# Patient Record
Sex: FEMALE | Race: WHITE | NOT HISPANIC OR LATINO | Employment: FULL TIME | URBAN - METROPOLITAN AREA
[De-identification: names, ages, dates, MRNs, and addresses within clinical notes are randomized per-mention and may not be internally consistent; named-entity substitution may affect disease eponyms.]

---

## 2023-12-28 ENCOUNTER — OFFICE VISIT (OUTPATIENT)
Dept: URGENT CARE | Facility: CLINIC | Age: 58
End: 2023-12-28
Payer: COMMERCIAL

## 2023-12-28 VITALS
RESPIRATION RATE: 14 BRPM | DIASTOLIC BLOOD PRESSURE: 74 MMHG | TEMPERATURE: 98.3 F | SYSTOLIC BLOOD PRESSURE: 110 MMHG | HEART RATE: 79 BPM | OXYGEN SATURATION: 98 %

## 2023-12-28 DIAGNOSIS — G93.31 POST VIRAL SYNDROME: Primary | ICD-10-CM

## 2023-12-28 PROCEDURE — 99212 OFFICE O/P EST SF 10 MIN: CPT | Mod: 25

## 2023-12-28 RX ORDER — OMEPRAZOLE 40 MG/1
40 CAPSULE, DELAYED RELEASE ORAL
COMMUNITY
Start: 2023-05-03

## 2023-12-28 RX ORDER — MELOXICAM 15 MG/1
1 TABLET ORAL DAILY
COMMUNITY
Start: 2019-06-21

## 2023-12-28 RX ORDER — DICYCLOMINE HYDROCHLORIDE 20 MG/1
1 TABLET ORAL EVERY 6 HOURS
COMMUNITY
Start: 2023-01-13

## 2023-12-28 RX ORDER — ZONISAMIDE 50 MG/1
50 CAPSULE ORAL
COMMUNITY
Start: 2017-10-20

## 2023-12-28 RX ORDER — SUMATRIPTAN SUCCINATE 100 MG/1
100 TABLET ORAL EVERY 2 HOUR PRN
COMMUNITY

## 2023-12-28 RX ORDER — FLUTICASONE PROPIONATE 50 MCG
SPRAY, SUSPENSION (ML) NASAL
COMMUNITY
Start: 2018-05-08

## 2023-12-28 RX ORDER — MECLIZINE HYDROCHLORIDE 25 MG/1
25 TABLET ORAL 3 TIMES DAILY
COMMUNITY
Start: 2023-07-05

## 2023-12-28 RX ORDER — LANCETS 33 GAUGE
EACH MISCELLANEOUS
COMMUNITY
Start: 2023-08-20

## 2023-12-28 RX ORDER — METFORMIN HYDROCHLORIDE 500 MG/1
500 TABLET ORAL 2 TIMES DAILY
COMMUNITY
Start: 2023-05-03

## 2023-12-28 RX ORDER — BUSPIRONE HYDROCHLORIDE 10 MG/1
10 TABLET ORAL
COMMUNITY
Start: 2018-04-09

## 2023-12-28 RX ORDER — VALACYCLOVIR HYDROCHLORIDE 1 G/1
TABLET, FILM COATED ORAL
COMMUNITY
Start: 2023-12-08

## 2023-12-28 RX ORDER — DULOXETIN HYDROCHLORIDE 60 MG/1
60 CAPSULE, DELAYED RELEASE ORAL
COMMUNITY
Start: 2023-11-02 | End: 2024-04-30

## 2023-12-28 RX ORDER — PAROXETINE HYDROCHLORIDE 40 MG/1
TABLET, FILM COATED ORAL
COMMUNITY
Start: 2018-03-11

## 2023-12-28 RX ORDER — LIRAGLUTIDE 6 MG/ML
INJECTION SUBCUTANEOUS
COMMUNITY
Start: 2023-12-11

## 2023-12-28 RX ORDER — PROPRANOLOL HYDROCHLORIDE 60 MG/1
60 CAPSULE, EXTENDED RELEASE ORAL
COMMUNITY
Start: 2017-09-13

## 2023-12-28 RX ORDER — FLUTICASONE PROPIONATE AND SALMETEROL 250; 50 UG/1; UG/1
POWDER RESPIRATORY (INHALATION)
COMMUNITY
Start: 2018-04-09

## 2023-12-28 RX ORDER — PREDNISONE 10 MG/1
30 TABLET ORAL DAILY
Qty: 21 TABLET | Refills: 0 | Status: SHIPPED | OUTPATIENT
Start: 2023-12-28 | End: 2024-01-04

## 2023-12-28 RX ORDER — EZETIMIBE 10 MG/1
10 TABLET ORAL
COMMUNITY
Start: 2023-02-24

## 2023-12-28 RX ORDER — PANTOPRAZOLE SODIUM 40 MG/1
TABLET, DELAYED RELEASE ORAL
COMMUNITY
Start: 2018-04-22

## 2023-12-28 RX ORDER — GABAPENTIN 300 MG/1
2 CAPSULE ORAL NIGHTLY
COMMUNITY
Start: 2023-05-29 | End: 2024-04-30

## 2023-12-28 NOTE — PROGRESS NOTES
[facility] URGENT CARE AMBER NOTE:      Name: Diane Ocampo, 58 y.o.    CSN:[id number]   MRN:[id number]    PCP: Rere Casillas, APRN-CNP    ALL:    Allergies   Allergen Reactions    Erythromycin GI Upset, Nausea And Vomiting and Nausea Only       History:    Chief Complaint: Cough, Hoarseness, and DRAINAGE (X 3 WEEKS)    Encounter Date: 12/28/2023      HPI: The history was obtained from the patient. Diane is a 58 y.o. female, who presents with a chief complaint of Cough, Hoarseness, and DRAINAGE (X 3 WEEKS). She states she was seen 3 weeks ago and was put on amoxicillin and nasal spray. This improved her symptoms but she still has drainage and cough. She denies fevers, N/V, headache, sob, sore throat, chest pain.     PMHx:    No past medical history on file.           Current Outpatient Medications   Medication Sig Dispense Refill    busPIRone (Buspar) 10 mg tablet 1 tablet (10 mg).      dicyclomine (Bentyl) 20 mg tablet Take 1 tablet (20 mg) by mouth every 6 hours.      DULoxetine (Cymbalta) 60 mg DR capsule Take 1 capsule (60 mg) by mouth once daily.      ezetimibe (Zetia) 10 mg tablet Take 1 tablet (10 mg) by mouth once daily.      fluticasone (Flonase) 50 mcg/actuation nasal spray       fluticasone propion-salmeteroL (Advair Diskus) 250-50 mcg/dose diskus inhaler       gabapentin (Neurontin) 300 mg capsule Take 2 capsules (600 mg) by mouth once daily at bedtime.      meclizine (Antivert) 25 mg tablet Take 1 tablet (25 mg) by mouth 3 times a day.      meloxicam (Mobic) 15 mg tablet Take 1 tablet (15 mg) by mouth once daily.      metFORMIN (Glucophage) 500 mg tablet Take 1 tablet (500 mg) by mouth twice a day.      omeprazole (PriLOSEC) 40 mg DR capsule Take 1 capsule (40 mg) by mouth once daily.      OneTouch Delica Plus Lancet 33 gauge misc       pantoprazole (ProtoNix) 40 mg EC tablet       PARoxetine (Paxil) 40 mg tablet       propranolol LA (Inderal LA) 60 mg 24 hr capsule Take 1  capsule (60 mg) by mouth.      SUMAtriptan (Imitrex) 100 mg tablet Take 1 tablet (100 mg) by mouth every 2 hours if needed.      valACYclovir (Valtrex) 1 gram tablet Take 2000 mg po q 12 hours x 1 day. Start ASAP at onset of symptoms.      Victoza 2-Robbie 0.6 mg/0.1 mL (18 mg/3 mL) injection Start: 0.6 mg SC qd x1wk, then 1.2 mg SC every day x 1 week, then 1.8 mg SC qd      zonisamide (Zonegran) 50 mg capsule Take 1 capsule (50 mg) by mouth.      predniSONE (Deltasone) 10 mg tablet Take 3 tablets (30 mg) by mouth once daily for 7 days. 21 tablet 0     No current facility-administered medications for this visit.         PMSx:  No past surgical history on file.    Fam Hx: No family history on file.    SOC. Hx:     Social History     Socioeconomic History    Marital status:      Spouse name: Not on file    Number of children: Not on file    Years of education: Not on file    Highest education level: Not on file   Occupational History    Not on file   Tobacco Use    Smoking status: Not on file    Smokeless tobacco: Not on file   Substance and Sexual Activity    Alcohol use: Not on file    Drug use: Not on file    Sexual activity: Not on file   Other Topics Concern    Not on file   Social History Narrative    Not on file     Social Determinants of Health     Financial Resource Strain: Not on file   Food Insecurity: Not on file   Transportation Needs: Not on file   Physical Activity: Not on file   Stress: Not on file   Social Connections: Not on file   Intimate Partner Violence: Not on file   Housing Stability: Not on file         Vitals:    12/28/23 1236   BP: 110/74   Pulse: 79   Resp: 14   Temp: 36.8 °C (98.3 °F)   SpO2: 98%                Physical Exam  Constitutional:       Appearance: Normal appearance.   HENT:      Right Ear: Tympanic membrane normal.      Left Ear: Tympanic membrane normal.      Nose: Congestion present.      Mouth/Throat:      Mouth: Mucous membranes are moist.      Pharynx: Oropharynx is  clear. Posterior oropharyngeal erythema present.   Eyes:      Conjunctiva/sclera: Conjunctivae normal.      Pupils: Pupils are equal, round, and reactive to light.      Comments: Glasses noted   Cardiovascular:      Rate and Rhythm: Normal rate and regular rhythm.   Pulmonary:      Effort: Pulmonary effort is normal.      Breath sounds: Normal breath sounds.   Musculoskeletal:      Comments: Noted walking boot on the L foot   Skin:     General: Skin is warm.   Neurological:      General: No focal deficit present.      Mental Status: She is alert and oriented to person, place, and time.   Psychiatric:         Mood and Affect: Mood normal.         Behavior: Behavior normal.         LABORATORY @ RADIOLOGICAL IMAGING (if done):     No results found for this or any previous visit (from the past 24 hour(s)).    UC COURSE/MEDICAL DECISION MAKING:    Diane is a 58 y.o., who presents with a working diagnosis of   1. Post viral syndrome      Diane was seen today for cough, hoarseness and drainage.  Diagnoses and all orders for this visit:  Post viral syndrome (Primary)  -     predniSONE (Deltasone) 10 mg tablet; Take 3 tablets (30 mg) by mouth once daily for 7 days.  This is most likely a sequelae syndrome from her previous illness. Will provide steroid burst for cough and congestion. She is a diabetic so I encouraged her to monitor her blood sugar closely as prednisone can spike blood sugar. Encouraged to push fluids.     At this time, there is a no evidence of pneumonia, hypoxia, OM, bacterial sinus infection, bacterial bronchitis, bacteremia, or sepsis.     In my medical opinion, I consider Diane to be low risk for any serious/life-threatening entity, and deem her stable for discharge. This is based on the information that was available to me at the time of this visit.      As we discussed, she is to return to our office or ER immediately if there is any worsening of her condition, such as increased cough, shortness of  breath, persistent fevers, repeated vomiting, dehydration, or if her condition worsens at all.        Lupe Alarcno PA-C   Advanced Practice Provider  [facility] URGENT CARE

## 2024-03-05 ENCOUNTER — OFFICE VISIT (OUTPATIENT)
Dept: URGENT CARE | Facility: CLINIC | Age: 59
End: 2024-03-05
Payer: COMMERCIAL

## 2024-03-05 VITALS
RESPIRATION RATE: 12 BRPM | OXYGEN SATURATION: 98 % | DIASTOLIC BLOOD PRESSURE: 71 MMHG | TEMPERATURE: 98.3 F | SYSTOLIC BLOOD PRESSURE: 107 MMHG | HEART RATE: 86 BPM

## 2024-03-05 DIAGNOSIS — L29.8 CHRONIC PRURITIC RASH IN ADULT: Primary | ICD-10-CM

## 2024-03-05 PROCEDURE — 87070 CULTURE OTHR SPECIMN AEROBIC: CPT

## 2024-03-05 PROCEDURE — 87075 CULTR BACTERIA EXCEPT BLOOD: CPT

## 2024-03-05 PROCEDURE — 87205 SMEAR GRAM STAIN: CPT

## 2024-03-05 PROCEDURE — 99212 OFFICE O/P EST SF 10 MIN: CPT | Performed by: PHYSICIAN ASSISTANT

## 2024-03-05 PROCEDURE — 87801 DETECT AGNT MULT DNA AMPLI: CPT

## 2024-03-05 RX ORDER — SULFAMETHOXAZOLE AND TRIMETHOPRIM 800; 160 MG/1; MG/1
1 TABLET ORAL 2 TIMES DAILY
Qty: 6 TABLET | Refills: 0 | Status: SHIPPED | OUTPATIENT
Start: 2024-03-05 | End: 2024-03-08

## 2024-03-05 RX ORDER — MUPIROCIN 20 MG/G
1 OINTMENT TOPICAL
Qty: 3 G | Refills: 0 | Status: SHIPPED | OUTPATIENT
Start: 2024-03-05 | End: 2024-03-12

## 2024-03-05 RX ORDER — TRIAMCINOLONE ACETONIDE 5 MG/G
OINTMENT TOPICAL 2 TIMES DAILY
Qty: 45 G | Refills: 1 | Status: SHIPPED | OUTPATIENT
Start: 2024-03-05 | End: 2025-03-05

## 2024-03-05 NOTE — PROGRESS NOTES
[facility] URGENT CARE AMBER NOTE:      Name: Diane Ocampo, 58 y.o.    CSN:[id number]   MRN:[id number]    PCP: Rere Casillas, PROSPER-CNP    ALL:    Allergies   Allergen Reactions    Erythromycin GI Upset, Nausea And Vomiting and Nausea Only       History:    Chief Complaint: Rash (ON ANKLE, ARM, BACK, STARTED 6 WEEKS AGO, ITCHY AND BURNING)    Encounter Date: 3/5/2024  18:30hrs    HPI: The history was obtained from the patient. Diane is a 58 y.o. female, who presents with a chief complaint of Rash (ON ANKLE, ARM, BACK, STARTED 6 WEEKS AGO, ITCHY AND BURNING) 6 weeks of a rash is pretty pruritic in nature affecting the right forearm, right tibia, and then the back, she has been applying some topical agents with minimal improvement, she is also used creams of various sorts and steroids over-the-counter with minimal improvement as well as Neosporin with no change in overall experience.  She denies any lesions affecting the mucosal membranes of her body, just she did have a surgery earlier in February this rash has been present before then where she was then treated with doxycycline and and this has not improved the overall appearance.    Patient has a history of some contact dermatitis affecting the left foot only years ago, she is never experienced this type of persistent lesions since then.    Denies any new detergents, soaps, lotions, denies any recent travel, she did have surgery last month, but she mentions the rash was present before then.    PMHx:    Past Medical History:   Diagnosis Date    Anxiety     Diabetes mellitus (CMS/HCC)     GERD (gastroesophageal reflux disease)     Migraine     Sensorineural hearing loss (SNHL) of left ear               Current Outpatient Medications   Medication Sig Dispense Refill    busPIRone (Buspar) 10 mg tablet 1 tablet (10 mg).      dicyclomine (Bentyl) 20 mg tablet Take 1 tablet (20 mg) by mouth every 6 hours.      DULoxetine (Cymbalta) 60 mg   capsule Take 1 capsule (60 mg) by mouth once daily.      ezetimibe (Zetia) 10 mg tablet Take 1 tablet (10 mg) by mouth once daily.      fluticasone (Flonase) 50 mcg/actuation nasal spray       fluticasone propion-salmeteroL (Advair Diskus) 250-50 mcg/dose diskus inhaler       gabapentin (Neurontin) 300 mg capsule Take 2 capsules (600 mg) by mouth once daily at bedtime.      meclizine (Antivert) 25 mg tablet Take 1 tablet (25 mg) by mouth 3 times a day.      meloxicam (Mobic) 15 mg tablet Take 1 tablet (15 mg) by mouth once daily.      metFORMIN (Glucophage) 500 mg tablet Take 1 tablet (500 mg) by mouth twice a day.      omeprazole (PriLOSEC) 40 mg DR capsule Take 1 capsule (40 mg) by mouth once daily.      OneTouch Delica Plus Lancet 33 gauge misc       pantoprazole (ProtoNix) 40 mg EC tablet       PARoxetine (Paxil) 40 mg tablet       propranolol LA (Inderal LA) 60 mg 24 hr capsule Take 1 capsule (60 mg) by mouth.      SUMAtriptan (Imitrex) 100 mg tablet Take 1 tablet (100 mg) by mouth every 2 hours if needed.      valACYclovir (Valtrex) 1 gram tablet Take 2000 mg po q 12 hours x 1 day. Start ASAP at onset of symptoms.      Victoza 2-Robbie 0.6 mg/0.1 mL (18 mg/3 mL) injection Start: 0.6 mg SC qd x1wk, then 1.2 mg SC every day x 1 week, then 1.8 mg SC qd      zonisamide (Zonegran) 50 mg capsule Take 1 capsule (50 mg) by mouth.      mupirocin (Bactroban) 2 % ointment Apply 1 Application topically 3 times a day for 7 days. 3 g 0    sulfamethoxazole-trimethoprim (Bactrim DS) 800-160 mg tablet Take 1 tablet by mouth 2 times a day for 3 days. 6 tablet 0    triamcinolone (Kenalog) 0.5 % ointment Apply topically 2 times a day. 45 g 1     No current facility-administered medications for this visit.         PMSx:    Past Surgical History:   Procedure Laterality Date    FOOT FRACTURE SURGERY      NASAL SEPTUM SURGERY         Fam Hx: No family history on file.    SOC. Hx:     Social History     Socioeconomic History    Marital  status:      Spouse name: Not on file    Number of children: Not on file    Years of education: Not on file    Highest education level: Not on file   Occupational History    Not on file   Tobacco Use    Smoking status: Every Day     Packs/day: .5     Types: Cigarettes    Smokeless tobacco: Never   Substance and Sexual Activity    Alcohol use: Not on file    Drug use: Not on file    Sexual activity: Not on file   Other Topics Concern    Not on file   Social History Narrative    Not on file     Social Determinants of Health     Financial Resource Strain: Not on file   Food Insecurity: Not on file   Transportation Needs: Not on file   Physical Activity: Not on file   Stress: Not on file   Social Connections: Not on file   Intimate Partner Violence: Not on file   Housing Stability: Not on file         Vitals:    03/05/24 1811   BP: 107/71   Pulse: 86   Resp: 12   Temp: 36.8 °C (98.3 °F)   SpO2: 98%                Physical Exam  Constitutional:       Appearance: Normal appearance. She is normal weight.   HENT:      Head: Normocephalic and atraumatic.      Nose: Nose normal.      Mouth/Throat:      Mouth: Mucous membranes are moist.   Eyes:      Extraocular Movements: Extraocular movements intact.   Cardiovascular:      Rate and Rhythm: Normal rate and regular rhythm.   Pulmonary:      Effort: Pulmonary effort is normal.      Breath sounds: Normal breath sounds.   Abdominal:      General: Abdomen is flat.   Musculoskeletal:         General: Normal range of motion.      Cervical back: Normal range of motion and neck supple.   Skin:     General: Skin is warm.      Findings: Rash present.      Comments: Rashes well circumscribed patch that seems to be slightly eroded with some scaling and raised lesions, this is out of convalesced on the right forearm as well as the right tibia, there is no significant discharge, there is some localized erythema but no observed cellulitic sort of similarity.  Initial impression would  be a contact dermatitis, but also a infection of sorts with staff and/or strep could be considered.  She was recently on doxycycline and February following her left foot surgery, she has mention the IV and more of the surgical access for IV fluids and sideration were done were on her left arm   Neurological:      Mental Status: She is alert and oriented to person, place, and time.   Psychiatric:         Behavior: Behavior normal.     Images of rash below    Right middle third of tibia    Right lateral ulnar forearm      UC COURSE/MEDICAL DECISION MAKING:    Diane is a 58 y.o., who presents with a working diagnosis of   1. Chronic pruritic rash in adult      Miscellaneous culture as well as a viral culture have been obtained will notify results when available, we discussed treatment plan to include use of topical steroid, A&E ointment plus Bactroban this will help reduce may be some of the inflammation and friability of the skin and may be moisturized to reduce the overall itching, she will then be given Bactrim to reduce any potential staph as she is diabetic and this may contribute to her findings.  If not improving she would then be referred to dermatology.        Pepe Cooney PA-C   Advanced Practice Provider  [facility] URGENT CARE

## 2024-03-06 LAB
HSV1 DNA SKIN QL NAA+PROBE: NOT DETECTED
HSV2 DNA SKIN QL NAA+PROBE: NOT DETECTED
VZV DNA SPEC QL NAA+PROBE: NOT DETECTED

## 2024-03-08 LAB
BACTERIA SPEC CULT: NORMAL
GRAM STN SPEC: NORMAL
GRAM STN SPEC: NORMAL

## 2025-03-03 ENCOUNTER — OFFICE VISIT (OUTPATIENT)
Dept: URGENT CARE | Facility: CLINIC | Age: 60
End: 2025-03-03
Payer: COMMERCIAL

## 2025-03-03 VITALS
TEMPERATURE: 97.4 F | SYSTOLIC BLOOD PRESSURE: 110 MMHG | OXYGEN SATURATION: 95 % | RESPIRATION RATE: 14 BRPM | HEART RATE: 80 BPM | DIASTOLIC BLOOD PRESSURE: 73 MMHG

## 2025-03-03 DIAGNOSIS — J01.90 ACUTE RHINOSINUSITIS: Primary | ICD-10-CM

## 2025-03-03 PROCEDURE — 99212 OFFICE O/P EST SF 10 MIN: CPT | Performed by: PHYSICIAN ASSISTANT

## 2025-03-03 RX ORDER — PSEUDOEPHEDRINE HCL 30 MG
30 TABLET ORAL EVERY 6 HOURS PRN
Qty: 28 TABLET | Refills: 0 | Status: SHIPPED | OUTPATIENT
Start: 2025-03-03 | End: 2025-03-10

## 2025-03-03 RX ORDER — AMOXICILLIN 500 MG/1
500 CAPSULE ORAL 2 TIMES DAILY
Qty: 20 CAPSULE | Refills: 0 | Status: SHIPPED | OUTPATIENT
Start: 2025-03-03 | End: 2025-03-13

## 2025-03-03 NOTE — PROGRESS NOTES
[facility] URGENT CARE   AMBER NOTE:      Name: Diane Ocampo, 59 y.o.    CSN:[id number]   MRN:[id number]    PCP: Rere Casillas, APRN-CNP    ALL:    Allergies   Allergen Reactions    Erythromycin GI Upset, Nausea And Vomiting and Nausea Only       History:    Chief Complaint: Headache and Facial Pain (Pressure behind eyes, earache x 4-5 days)    Encounter Date: 3/3/2025      HPI: The history was obtained from the patient. Diane is a 59 y.o. female, who presents with a chief complaint of Headache and Facial Pain (Pressure behind eyes, earache x 4-5 days) has taken Mucinex with some relief, but notes that there is significant pressure pain and discomfort especially with yellow-greenish tinged sputum, and significant pain with leaning forward as a pressure feels in her face.  She also has ear popping and fullness ongoing now for the last 5 days.    PMHx:    Past Medical History:   Diagnosis Date    Anxiety     Diabetes mellitus (Multi)     GERD (gastroesophageal reflux disease)     Migraine     Sensorineural hearing loss (SNHL) of left ear               Current Outpatient Medications   Medication Sig Dispense Refill    busPIRone (Buspar) 10 mg tablet 1 tablet (10 mg).      dicyclomine (Bentyl) 20 mg tablet Take 1 tablet (20 mg) by mouth every 6 hours.      DULoxetine (Cymbalta) 60 mg DR capsule Take 1 capsule (60 mg) by mouth once daily.      ezetimibe (Zetia) 10 mg tablet Take 1 tablet (10 mg) by mouth once daily.      fluticasone (Flonase) 50 mcg/actuation nasal spray       fluticasone propion-salmeteroL (Advair Diskus) 250-50 mcg/dose diskus inhaler       gabapentin (Neurontin) 300 mg capsule Take 2 capsules (600 mg) by mouth once daily at bedtime.      meclizine (Antivert) 25 mg tablet Take 1 tablet (25 mg) by mouth 3 times a day.      meloxicam (Mobic) 15 mg tablet Take 1 tablet (15 mg) by mouth once daily.      metFORMIN (Glucophage) 500 mg tablet Take 1 tablet (500 mg) by mouth  twice a day.      omeprazole (PriLOSEC) 40 mg DR capsule Take 1 capsule (40 mg) by mouth once daily.      OneTouch Delica Plus Lancet 33 gauge misc       pantoprazole (ProtoNix) 40 mg EC tablet       PARoxetine (Paxil) 40 mg tablet       propranolol LA (Inderal LA) 60 mg 24 hr capsule Take 1 capsule (60 mg) by mouth.      SUMAtriptan (Imitrex) 100 mg tablet Take 1 tablet (100 mg) by mouth every 2 hours if needed.      triamcinolone (Kenalog) 0.5 % ointment Apply topically 2 times a day. 45 g 1    valACYclovir (Valtrex) 1 gram tablet Take 2000 mg po q 12 hours x 1 day. Start ASAP at onset of symptoms.      Victoza 2-Robbie 0.6 mg/0.1 mL (18 mg/3 mL) injection Start: 0.6 mg SC qd x1wk, then 1.2 mg SC every day x 1 week, then 1.8 mg SC qd      zonisamide (Zonegran) 50 mg capsule Take 1 capsule (50 mg) by mouth.      amoxicillin (Amoxil) 500 mg capsule Take 1 capsule (500 mg) by mouth 2 times a day for 10 days. 20 capsule 0    dextran 70-hypromellose, PF, (Bion Tears) 0.1-0.3 % ophthalmic solution Administer 1 drop into both eyes 3 times a day as needed for dry eyes for up to 7 days. 28 each 0    pseudoephedrine (Sudafed) 30 mg tablet Take 1 tablet (30 mg) by mouth every 6 hours if needed for congestion for up to 7 days. 28 tablet 0     No current facility-administered medications for this visit.         PMSx:    Past Surgical History:   Procedure Laterality Date    FOOT FRACTURE SURGERY      NASAL SEPTUM SURGERY         Fam Hx: No family history on file.    SOC. Hx:     Social History     Socioeconomic History    Marital status:      Spouse name: Not on file    Number of children: Not on file    Years of education: Not on file    Highest education level: Not on file   Occupational History    Not on file   Tobacco Use    Smoking status: Every Day     Current packs/day: 0.50     Types: Cigarettes    Smokeless tobacco: Never   Substance and Sexual Activity    Alcohol use: Not on file    Drug use: Not on file     Sexual activity: Not on file   Other Topics Concern    Not on file   Social History Narrative    Not on file     Social Drivers of Health     Financial Resource Strain: Not on file   Food Insecurity: Not on file   Transportation Needs: Not on file   Physical Activity: Not on file   Stress: Not on file   Social Connections: Not on file   Intimate Partner Violence: Not on file   Housing Stability: Not on file         Vitals:    03/03/25 1548   BP: 110/73   Pulse: 80   Resp: 14   Temp: 36.3 °C (97.4 °F)   SpO2: 95%                Physical Exam  Vitals reviewed.   Constitutional:       Appearance: Normal appearance.   HENT:      Head: Normocephalic and atraumatic.      Nose: Mucosal edema and congestion present.      Right Turbinates: Enlarged and swollen.      Left Turbinates: Enlarged and swollen.      Right Sinus: Maxillary sinus tenderness and frontal sinus tenderness present.      Left Sinus: Maxillary sinus tenderness and frontal sinus tenderness present.      Mouth/Throat:      Mouth: Mucous membranes are moist.      Pharynx: No oropharyngeal exudate.   Eyes:      Extraocular Movements: Extraocular movements intact.      Pupils: Pupils are equal, round, and reactive to light.   Cardiovascular:      Rate and Rhythm: Normal rate.      Pulses: Normal pulses.   Pulmonary:      Effort: Pulmonary effort is normal.      Breath sounds: Normal breath sounds. No decreased breath sounds, wheezing or rhonchi.   Musculoskeletal:         General: Normal range of motion.      Cervical back: Normal range of motion.   Skin:     General: Skin is warm.      Capillary Refill: Capillary refill takes less than 2 seconds.   Neurological:      Mental Status: She is alert and oriented to person, place, and time.             ____________________________________________________________________    I did personally review Diane's past medical history, surgical history, social history, as well as family history (when relevant).  In this case,  I also oversaw the her drug management by reviewing her medication list, allergy list, as well as the medications that I prescribed during the UC course and/or recommended as an out-patient (including possible OTC medications such as acetaminophen, NSAIDs , etc).    After reviewing the items above, I did look at previous medical documentation, such as recent hospitalizations, office visits, and/or recent consultations with PCP/specialist.                          SDOH:   Another factor that I considered in Diane's care was her Social Determinants of Health (SDOH). During this UC encounter, she did not have social determinants of health. Those SDOH influencing Diane's care are: none      _____________________________________________________________________      UC COURSE/MEDICAL DECISION MAKING:    Diane is a 59 y.o., who presents with a working diagnosis of   1. Acute rhinosinusitis     with a differential to include: Influenza, parainfluenza, rhinovirus, adenovirus, metapneumovirus, coronavirus, COVID-19, postnasal drip, strep pharyngitis, GERD, retropharyngeal abscess, tonsillitis, adenitis, seasonal allergies    Patient be treated with pseudoephedrine and encouraged to provider self with nasal sprays such as Flonase, will also provide patient with recommendations on use of lubricants for her eye should she become an experience of overt side effects associated with the pseudoephedrine use, and if not improving then she is encouraged to take the amoxicillin, she was reassured and discharged        Pepe Cooney PA-C   Advanced Practice Provider  [facility] URGENT CARE    Please note: While the patient may or may not have received printed discharge paperwork, all relevant medical findings, test results, and treatment details are accessible through the electronic medical record system. The patient is encouraged to review their chart via the patient portal for comprehensive information  and follow-up instructions.